# Patient Record
Sex: MALE | Race: OTHER | Employment: UNEMPLOYED | URBAN - METROPOLITAN AREA
[De-identification: names, ages, dates, MRNs, and addresses within clinical notes are randomized per-mention and may not be internally consistent; named-entity substitution may affect disease eponyms.]

---

## 2020-11-21 PROBLEM — J34.0 CELLULITIS OF NOSTRIL: Status: ACTIVE | Noted: 2020-11-21

## 2024-05-14 ENCOUNTER — APPOINTMENT (OUTPATIENT)
Dept: GENERAL RADIOLOGY | Age: 39
End: 2024-05-14
Attending: NURSE PRACTITIONER

## 2024-05-14 ENCOUNTER — HOSPITAL ENCOUNTER (OUTPATIENT)
Age: 39
Discharge: HOME | End: 2024-05-14

## 2024-05-14 VITALS
HEIGHT: 70 IN | RESPIRATION RATE: 16 BRPM | SYSTOLIC BLOOD PRESSURE: 114 MMHG | HEART RATE: 73 BPM | TEMPERATURE: 98 F | DIASTOLIC BLOOD PRESSURE: 69 MMHG | BODY MASS INDEX: 22.76 KG/M2 | WEIGHT: 159 LBS | OXYGEN SATURATION: 98 %

## 2024-05-14 DIAGNOSIS — R05.1 ACUTE COUGH: Primary | ICD-10-CM

## 2024-05-14 DIAGNOSIS — J98.8 VIRAL RESPIRATORY ILLNESS: ICD-10-CM

## 2024-05-14 DIAGNOSIS — B97.89 VIRAL RESPIRATORY ILLNESS: ICD-10-CM

## 2024-05-14 DIAGNOSIS — R52 BODY ACHES: ICD-10-CM

## 2024-05-14 DIAGNOSIS — H65.91 RIGHT NON-SUPPURATIVE OTITIS MEDIA: ICD-10-CM

## 2024-05-14 LAB
POCT INFLUENZA A: NEGATIVE
POCT INFLUENZA B: NEGATIVE

## 2024-05-14 PROCEDURE — 71046 X-RAY EXAM CHEST 2 VIEWS: CPT | Performed by: NURSE PRACTITIONER

## 2024-05-14 PROCEDURE — 99204 OFFICE O/P NEW MOD 45 MIN: CPT | Performed by: NURSE PRACTITIONER

## 2024-05-14 PROCEDURE — 87502 INFLUENZA DNA AMP PROBE: CPT | Performed by: NURSE PRACTITIONER

## 2024-05-14 RX ORDER — BENZONATATE 100 MG/1
100 CAPSULE ORAL 3 TIMES DAILY PRN
Qty: 30 CAPSULE | Refills: 0 | Status: SHIPPED | OUTPATIENT
Start: 2024-05-14 | End: 2024-06-13

## 2024-05-14 RX ORDER — AMOXICILLIN AND CLAVULANATE POTASSIUM 875; 125 MG/1; MG/1
1 TABLET, FILM COATED ORAL 2 TIMES DAILY
Qty: 14 TABLET | Refills: 0 | Status: SHIPPED | OUTPATIENT
Start: 2024-05-14 | End: 2024-05-21

## 2024-05-14 NOTE — DISCHARGE INSTRUCTIONS
Take the medications as prescribed.  Over-the-counter Tylenol/Motrin as needed for fever/body aches.  Stay well-hydrated.  Follow-up with your doctor.

## 2024-05-14 NOTE — ED PROVIDER NOTES
Patient Seen in: [facility]      History     Chief Complaint   Patient presents with    Cough/URI    Ear Pain     Stated Complaint: flu symptoms -fever-cough-chest pain    Subjective:   A 38-year-old male, who presents with a dry cough that started 4 days ago.  States it has become more productive, with some green sputum.  Has been taking Tylenol and over-the-counter cough syrup which is not helping.  States his daughter daughter is also have a cough.  Has also had fever at home with a high of 102.  The last couple days he has had bodyaches.  Last night he developed right-sided ear pain.  States that only when he coughs, he feels pain to his throat and middle of the chest region.  Otherwise no chest pain.  States he did a COVID test at home, a couple times and were both negative.            Objective:   History reviewed. No pertinent past medical history.           History reviewed. No pertinent surgical history.             Social History     Socioeconomic History    Marital status:    Tobacco Use    Smoking status: Former     Current packs/day: 0.00     Average packs/day: 0.3 packs/day for 20.0 years (5.0 ttl pk-yrs)     Types: Cigarettes     Start date: 2001     Quit date: 2021     Years since quittin.9    Smokeless tobacco: Former     Quit date: 2021    Tobacco comments:     FORMER   Vaping Use    Vaping status: Former    Substances: Nicotine   Substance and Sexual Activity    Alcohol use: Yes     Comment: ONCE MONTHLY    Drug use: No    Sexual activity: Yes     Partners: Female              Review of Systems   Constitutional:  Positive for fever.   HENT:  Positive for ear pain. Negative for congestion, rhinorrhea and sore throat.    Respiratory:  Positive for cough.    Musculoskeletal:  Positive for myalgias.   All other systems reviewed and are negative.      Positive for stated complaint: flu symptoms -fever-cough-chest pain  Other systems are as noted in  HPI.  Constitutional and vital signs reviewed.      All other systems reviewed and negative except as noted above.    Physical Exam     ED Triage Vitals [05/14/24 0946]   /69   Pulse 73   Resp 16   Temp 97.8 °F (36.6 °C)   Temp src Temporal   SpO2 98 %   O2 Device None (Room air)       Current Vitals:   Vital Signs  BP: 114/69  Pulse: 73  Resp: 16  Temp: 97.8 °F (36.6 °C)  Temp src: Temporal    Oxygen Therapy  SpO2: 98 %  O2 Device: None (Room air)            Physical Exam  Vitals and nursing note reviewed.   Constitutional:       Appearance: Normal appearance. He is not ill-appearing, toxic-appearing or diaphoretic.   HENT:      Head:      Jaw: No trismus.      Right Ear: Ear canal and external ear normal. No mastoid tenderness. Tympanic membrane is injected and bulging. Tympanic membrane is not perforated.      Left Ear: Tympanic membrane, ear canal and external ear normal.      Nose: No rhinorrhea.      Mouth/Throat:      Lips: Pink.      Mouth: No angioedema.      Pharynx: Oropharynx is clear. Uvula midline. No pharyngeal swelling.   Cardiovascular:      Rate and Rhythm: Normal rate and regular rhythm.      Pulses: Normal pulses.      Heart sounds: Normal heart sounds.   Pulmonary:      Effort: Pulmonary effort is normal. No respiratory distress.      Breath sounds: Normal breath sounds. No stridor. No wheezing, rhonchi or rales.   Chest:      Chest wall: No tenderness.   Skin:     General: Skin is warm and dry.      Coloration: Skin is not jaundiced or pale.      Findings: No rash.   Neurological:      General: No focal deficit present.      Mental Status: He is alert.   Psychiatric:         Mood and Affect: Mood normal.               ED Course     Labs Reviewed   POCT FLU TEST - Normal    Narrative:     This assay is a rapid molecular in vitro test utilizing nucleic acid amplification of influenza A and B viral RNA.     XR CHEST PA + LAT CHEST (CPT=71046)    Result Date: 5/14/2024  PROCEDURE:  XR CHEST  PA + LAT CHEST (CPT=71046)  INDICATIONS:  flu symptoms -fever-cough-chest pain  COMPARISON:  None.  TECHNIQUE:  PA and lateral chest radiographs were obtained.  PATIENT STATED HISTORY: (As transcribed by Technologist)  Patient states he has had cough, congestion, fever and body aches for the past 4-5 days.   FINDINGS:  The heart is normal in size.  Subtle bronchial wall thickening is present.  No signs of bronchial dilation or obvious bronchiectasis.  No focal consolidation is seen.  The costophrenic angles are sharp.  No sign of pneumothorax.            CONCLUSION:  Nonspecific subtle bronchial wall thickening, but consider subtle bronchitis.    LOCATION:  [facility]   Dictated by (CST): Varghese Thomas MD on 5/14/2024 at 10:14 AM     Finalized by (CST): Varghese Thomas MD on 5/14/2024 at 10:14 AM                         [facility]                                         Medical Decision Making  Differential diagnosis initially included but was not limited to: Flu, otitis media, pneumonia, other viral respiratory illness    Nontoxic-appearing 38-year-old male, with productive cough worsening over the last few days, body aches, right ear pain, fevers.  No mastoid tenderness.  Right tympanic membrane is injected and slightly bulging, concerning for otitis media.  Not likely to be mastoiditis.  No respiratory distress.  No adventitious breath sounds.  Will obtain chest x-ray rule out pneumonia.  Will obtain flu swab.  Negative flu.  I personally viewed, independently interpreted and radiology report was reviewed.  My personal interpretation is no pneumonia.  Likely viral cough.    Supportive/home management of diagnosis/illness/injury discussed. Red flag symptoms discussed.  Signs and symptoms/criteria that would necessitate reevaluation, including ER evaluation discussed.  Patient and/or responsible adult verbalize and agree with management and plan of care.    Speech recognition software was used during this dictation.  There  may be minor errors in transcription.          Amount and/or Complexity of Data Reviewed  Labs: ordered. Decision-making details documented in ED Course.  Radiology: ordered and independent interpretation performed. Decision-making details documented in ED Course.    Risk  OTC drugs.  Prescription drug management.        Disposition and Plan     Clinical Impression:  1. Acute cough    2. Body aches    3. Right non-suppurative otitis media    4. Viral respiratory illness         Disposition:  Discharge  5/14/2024 10:25 am    Follow-up:  Melissa Penn MD  [address] [address] [phone]    Call in 2 days            Medications Prescribed:  Current Discharge Medication List        START taking these medications    Details   amoxicillin clavulanate 875-125 MG Oral Tab Take 1 tablet by mouth 2 (two) times daily for 7 days.  Qty: 14 tablet, Refills: 0      benzonatate 100 MG Oral Cap Take 1 capsule (100 mg total) by mouth 3 (three) times daily as needed.  Qty: 30 capsule, Refills: 0

## 2024-10-24 ENCOUNTER — OFFICE VISIT (OUTPATIENT)
Dept: FAMILY MEDICINE CLINIC | Facility: CLINIC | Age: 39
End: 2024-10-24
Payer: COMMERCIAL

## 2024-10-24 VITALS
HEART RATE: 102 BPM | WEIGHT: 160 LBS | DIASTOLIC BLOOD PRESSURE: 72 MMHG | HEIGHT: 70 IN | TEMPERATURE: 98 F | SYSTOLIC BLOOD PRESSURE: 110 MMHG | BODY MASS INDEX: 22.9 KG/M2 | OXYGEN SATURATION: 99 % | RESPIRATION RATE: 18 BRPM

## 2024-10-24 DIAGNOSIS — L03.032 PARONYCHIA OF GREAT TOE OF LEFT FOOT: Primary | ICD-10-CM

## 2024-10-24 PROCEDURE — 99214 OFFICE O/P EST MOD 30 MIN: CPT | Performed by: FAMILY MEDICINE

## 2024-10-24 RX ORDER — CEPHALEXIN 500 MG/1
500 CAPSULE ORAL 3 TIMES DAILY
Qty: 30 CAPSULE | Refills: 0 | Status: SHIPPED | OUTPATIENT
Start: 2024-10-24 | End: 2024-11-03

## 2024-10-24 RX ORDER — CETIRIZINE HYDROCHLORIDE 10 MG/1
10 TABLET ORAL DAILY
COMMUNITY

## 2024-10-24 NOTE — PROGRESS NOTES
Chief Complaint   Patient presents with    Toenail     Toe nail infection. Left large toe, pain and pus, nail is loose, not painful unless he is pushing on it. Pus material does come out when he is in the shower.        HPI:    Patient ID: Eugene Damian is a 38 year old male.    HPI left toe nail bit kati infection. Since 1 month tender and pus is coming. No red. No injury. No concern for fracture.     Review of Systems  Pos toinail pain      Current Outpatient Medications   Medication Sig Dispense Refill    cetirizine 10 MG Oral Tab Take 1 tablet (10 mg total) by mouth daily.      Ergocalciferol (VITAMIN D OR) Take by mouth.      Multiple Vitamin (MULTI-VITAMIN DAILY OR) Take 1 tablet by mouth.       Allergies:Allergies[1]    HISTORY:  No past medical history on file.   No past surgical history on file.   Family History   Problem Relation Age of Onset    No Known Problems Father     Thyroid disease Mother     No Known Problems Maternal Grandmother     No Known Problems Maternal Grandfather     No Known Problems Paternal Grandmother     No Known Problems Paternal Grandfather       Social History:   Social History     Socioeconomic History    Marital status:    Tobacco Use    Smoking status: Former     Current packs/day: 0.00     Average packs/day: 0.3 packs/day for 20.0 years (5.0 ttl pk-yrs)     Types: Cigarettes     Start date: 5/17/2001     Quit date: 5/17/2021     Years since quitting: 3.5    Smokeless tobacco: Former     Quit date: 4/27/2021    Tobacco comments:     FORMER   Vaping Use    Vaping status: Former    Substances: Nicotine   Substance and Sexual Activity    Alcohol use: Yes     Comment: ONCE MONTHLY    Drug use: No    Sexual activity: Yes     Partners: Female        PHYSICAL EXAM:    /72 (BP Location: Left arm, Patient Position: Sitting, Cuff Size: adult)   Pulse 102   Temp 98.1 °F (36.7 °C) (Temporal)   Resp 18   Ht 5' 10\" (1.778 m)   Wt 160 lb (72.6 kg)   SpO2 99%   BMI 22.96  kg/m²    Physical Exam  Constitutional:       General: He is not in acute distress.     Appearance: He is not ill-appearing.   Skin:     Comments: Exam of left toe nail redness with tenderenss no pus noted   Neurological:      Mental Status: He is alert.              ASSESSMENT/PLAN:   1. Paronychia of great toe of left foot  Meds stared  See podiatry  - cephALEXin 500 MG Oral Cap; Take 1 capsule (500 mg total) by mouth 3 (three) times daily for 10 days.  Dispense: 30 capsule; Refill: 0  - Podiatry Referral - In Network             No follow-ups on file.        [1]   Allergies  Allergen Reactions    Shrimp HIVES    Other RASH     Detergent

## 2025-01-16 ENCOUNTER — OFFICE VISIT (OUTPATIENT)
Dept: FAMILY MEDICINE CLINIC | Facility: CLINIC | Age: 40
End: 2025-01-16
Payer: COMMERCIAL

## 2025-01-16 VITALS
WEIGHT: 160 LBS | BODY MASS INDEX: 23 KG/M2 | SYSTOLIC BLOOD PRESSURE: 109 MMHG | OXYGEN SATURATION: 98 % | RESPIRATION RATE: 14 BRPM | HEART RATE: 86 BPM | TEMPERATURE: 98 F | DIASTOLIC BLOOD PRESSURE: 66 MMHG

## 2025-01-16 DIAGNOSIS — R05.1 ACUTE COUGH: ICD-10-CM

## 2025-01-16 DIAGNOSIS — J01.40 ACUTE NON-RECURRENT PANSINUSITIS: Primary | ICD-10-CM

## 2025-01-16 PROCEDURE — 99213 OFFICE O/P EST LOW 20 MIN: CPT | Performed by: NURSE PRACTITIONER

## 2025-01-16 RX ORDER — PREDNISONE 20 MG/1
20 TABLET ORAL DAILY
Qty: 5 TABLET | Refills: 0 | Status: SHIPPED | OUTPATIENT
Start: 2025-01-16 | End: 2025-01-21

## 2025-01-16 RX ORDER — ALBUTEROL SULFATE 90 UG/1
1-2 INHALANT RESPIRATORY (INHALATION) EVERY 4 HOURS PRN
Qty: 1 EACH | Refills: 0 | Status: SHIPPED | OUTPATIENT
Start: 2025-01-16

## 2025-01-16 NOTE — PROGRESS NOTES
HPI:   Sinus Problem  This is a new problem. Episode onset: 2 weeks. The problem has been gradually worsening (last 3-4 days) since onset. Maximum temperature: , mostly at night. Associated symptoms include chills, congestion, coughing, ear pain (right), headaches, shortness of breath and sinus pressure. Pertinent negatives include no sore throat. Past treatments include acetaminophen (and Tessalon). The treatment provided no relief.        Current Outpatient Medications   Medication Sig Dispense Refill    cetirizine 10 MG Oral Tab Take 1 tablet (10 mg total) by mouth daily.      Ergocalciferol (VITAMIN D OR) Take by mouth.      Multiple Vitamin (MULTI-VITAMIN DAILY OR) Take 1 tablet by mouth.        No past medical history on file.   No past surgical history on file.   Family History   Problem Relation Age of Onset    No Known Problems Father     Thyroid disease Mother     No Known Problems Maternal Grandmother     No Known Problems Maternal Grandfather     No Known Problems Paternal Grandmother     No Known Problems Paternal Grandfather       Social History     Socioeconomic History    Marital status:    Tobacco Use    Smoking status: Former     Current packs/day: 0.00     Average packs/day: 0.3 packs/day for 20.0 years (5.0 ttl pk-yrs)     Types: Cigarettes     Start date: 5/17/2001     Quit date: 5/17/2021     Years since quitting: 3.6     Passive exposure: Never    Smokeless tobacco: Former     Quit date: 4/27/2021    Tobacco comments:     FORMER   Vaping Use    Vaping status: Former    Substances: Nicotine   Substance and Sexual Activity    Alcohol use: Yes     Comment: ONCE MONTHLY    Drug use: No    Sexual activity: Yes     Partners: Female         REVIEW OF SYSTEMS:   Review of Systems   Constitutional:  Positive for chills, fatigue and fever.   HENT:  Positive for congestion, ear pain (right), postnasal drip, rhinorrhea, sinus pressure and sinus pain. Negative for sore throat.    Respiratory:   Positive for cough, chest tightness and shortness of breath. Negative for wheezing.    Cardiovascular:  Negative for chest pain and palpitations.   Gastrointestinal:  Negative for diarrhea, nausea and vomiting.   Musculoskeletal:  Negative for myalgias.   Neurological:  Positive for headaches.       EXAM:   /66   Pulse 86   Temp 97.9 °F (36.6 °C) (Temporal)   Resp 14   Wt 160 lb (72.6 kg)   SpO2 98%   BMI 22.96 kg/m²   Physical Exam  Constitutional:       General: He is not in acute distress.     Appearance: He is ill-appearing.   HENT:      Right Ear: Ear canal and external ear normal. Tympanic membrane is erythematous. Tympanic membrane is not bulging.      Left Ear: Tympanic membrane, ear canal and external ear normal.      Nose: Mucosal edema and rhinorrhea present. Rhinorrhea is purulent.      Right Sinus: Maxillary sinus tenderness and frontal sinus tenderness present.      Left Sinus: Maxillary sinus tenderness and frontal sinus tenderness present.      Mouth/Throat:      Lips: Pink.      Mouth: Mucous membranes are moist.      Pharynx: Oropharynx is clear. Uvula midline.   Eyes:      Conjunctiva/sclera: Conjunctivae normal.   Cardiovascular:      Rate and Rhythm: Normal rate and regular rhythm.      Heart sounds: Normal heart sounds.   Pulmonary:      Effort: Pulmonary effort is normal.      Breath sounds: Normal breath sounds.   Neurological:      Mental Status: He is alert.         ASSESSMENT AND PLAN:   There are no diagnoses linked to this encounter.

## 2025-03-07 ENCOUNTER — OFFICE VISIT (OUTPATIENT)
Dept: FAMILY MEDICINE CLINIC | Facility: CLINIC | Age: 40
End: 2025-03-07
Payer: COMMERCIAL

## 2025-03-07 ENCOUNTER — LAB ENCOUNTER (OUTPATIENT)
Dept: LAB | Age: 40
End: 2025-03-07
Payer: COMMERCIAL

## 2025-03-07 VITALS
HEIGHT: 70 IN | WEIGHT: 160.19 LBS | OXYGEN SATURATION: 98 % | TEMPERATURE: 98 F | RESPIRATION RATE: 16 BRPM | BODY MASS INDEX: 22.93 KG/M2

## 2025-03-07 DIAGNOSIS — Z00.00 GENERAL MEDICAL EXAMINATION: ICD-10-CM

## 2025-03-07 DIAGNOSIS — R55 VASOVAGAL RESPONSE: Primary | ICD-10-CM

## 2025-03-07 DIAGNOSIS — R42 DIZZINESS ON STANDING: ICD-10-CM

## 2025-03-07 LAB
ALBUMIN SERPL-MCNC: 5.2 G/DL (ref 3.2–4.8)
ALBUMIN/GLOB SERPL: 1.8 {RATIO} (ref 1–2)
ALP LIVER SERPL-CCNC: 72 U/L
ALT SERPL-CCNC: 23 U/L
ANION GAP SERPL CALC-SCNC: 9 MMOL/L (ref 0–18)
AST SERPL-CCNC: 24 U/L (ref ?–34)
BASOPHILS # BLD AUTO: 0.1 X10(3) UL (ref 0–0.2)
BASOPHILS NFR BLD AUTO: 2.2 %
BILIRUB SERPL-MCNC: 0.7 MG/DL (ref 0.3–1.2)
BUN BLD-MCNC: 15 MG/DL (ref 9–23)
CALCIUM BLD-MCNC: 10.5 MG/DL (ref 8.7–10.6)
CHLORIDE SERPL-SCNC: 101 MMOL/L (ref 98–112)
CHOLEST SERPL-MCNC: 167 MG/DL (ref ?–200)
CO2 SERPL-SCNC: 30 MMOL/L (ref 21–32)
CREAT BLD-MCNC: 1.26 MG/DL
DEPRECATED HBV CORE AB SER IA-ACNC: 127 NG/ML
EGFRCR SERPLBLD CKD-EPI 2021: 73 ML/MIN/1.73M2 (ref 60–?)
EOSINOPHIL # BLD AUTO: 0.31 X10(3) UL (ref 0–0.7)
EOSINOPHIL NFR BLD AUTO: 6.7 %
ERYTHROCYTE [DISTWIDTH] IN BLOOD BY AUTOMATED COUNT: 13.7 %
EST. AVERAGE GLUCOSE BLD GHB EST-MCNC: 128 MG/DL (ref 68–126)
FASTING PATIENT LIPID ANSWER: YES
FASTING STATUS PATIENT QL REPORTED: YES
GLOBULIN PLAS-MCNC: 2.9 G/DL (ref 2–3.5)
GLUCOSE BLD-MCNC: 95 MG/DL (ref 70–99)
HBA1C MFR BLD: 6.1 % (ref ?–5.7)
HCT VFR BLD AUTO: 48.9 %
HDLC SERPL-MCNC: 78 MG/DL (ref 40–59)
HGB BLD-MCNC: 15.5 G/DL
IMM GRANULOCYTES # BLD AUTO: 0.01 X10(3) UL (ref 0–1)
IMM GRANULOCYTES NFR BLD: 0.2 %
IRON SATN MFR SERPL: 35 %
IRON SERPL-MCNC: 127 UG/DL
LDLC SERPL CALC-MCNC: 80 MG/DL (ref ?–100)
LYMPHOCYTES # BLD AUTO: 1.94 X10(3) UL (ref 1–4)
LYMPHOCYTES NFR BLD AUTO: 41.8 %
MCH RBC QN AUTO: 26.5 PG (ref 26–34)
MCHC RBC AUTO-ENTMCNC: 31.7 G/DL (ref 31–37)
MCV RBC AUTO: 83.4 FL
MONOCYTES # BLD AUTO: 0.45 X10(3) UL (ref 0.1–1)
MONOCYTES NFR BLD AUTO: 9.7 %
NEUTROPHILS # BLD AUTO: 1.83 X10 (3) UL (ref 1.5–7.7)
NEUTROPHILS # BLD AUTO: 1.83 X10(3) UL (ref 1.5–7.7)
NEUTROPHILS NFR BLD AUTO: 39.4 %
NONHDLC SERPL-MCNC: 89 MG/DL (ref ?–130)
OSMOLALITY SERPL CALC.SUM OF ELEC: 291 MOSM/KG (ref 275–295)
PLATELET # BLD AUTO: 254 10(3)UL (ref 150–450)
POTASSIUM SERPL-SCNC: 4.6 MMOL/L (ref 3.5–5.1)
PROT SERPL-MCNC: 8.1 G/DL (ref 5.7–8.2)
RBC # BLD AUTO: 5.86 X10(6)UL
SODIUM SERPL-SCNC: 140 MMOL/L (ref 136–145)
TOTAL IRON BINDING CAPACITY: 367 UG/DL (ref 250–425)
TRANSFERRIN SERPL-MCNC: 306 MG/DL (ref 215–365)
TRIGL SERPL-MCNC: 40 MG/DL (ref 30–149)
TSI SER-ACNC: 1.25 UIU/ML (ref 0.55–4.78)
VLDLC SERPL CALC-MCNC: 6 MG/DL (ref 0–30)
WBC # BLD AUTO: 4.6 X10(3) UL (ref 4–11)

## 2025-03-07 PROCEDURE — 80061 LIPID PANEL: CPT

## 2025-03-07 PROCEDURE — 83540 ASSAY OF IRON: CPT

## 2025-03-07 PROCEDURE — 82728 ASSAY OF FERRITIN: CPT

## 2025-03-07 PROCEDURE — 83036 HEMOGLOBIN GLYCOSYLATED A1C: CPT

## 2025-03-07 PROCEDURE — 85025 COMPLETE CBC W/AUTO DIFF WBC: CPT

## 2025-03-07 PROCEDURE — 36415 COLL VENOUS BLD VENIPUNCTURE: CPT

## 2025-03-07 PROCEDURE — 99213 OFFICE O/P EST LOW 20 MIN: CPT

## 2025-03-07 PROCEDURE — 83550 IRON BINDING TEST: CPT

## 2025-03-07 PROCEDURE — 80053 COMPREHEN METABOLIC PANEL: CPT

## 2025-03-07 PROCEDURE — 84443 ASSAY THYROID STIM HORMONE: CPT

## 2025-03-07 NOTE — PROGRESS NOTES
Chief Complaint   Patient presents with    Dizziness     Fatigue   Cold hands and feet   Symptoms for over a year            HPI  Eugene Damian is a 39 year old M pt who presents today for dizziness    Describes symptoms as feeling lightheaded when standing. Symptoms occur during various activities such as working, playing with children, standing after sitting for 20-30 minutes, or upon waking. Each episode lasts 2-5 minutes and is accompanied by feeling cold, diaphoresis of the hands, feet, and scalp. The patient reports experiencing these symptoms for over a year, with a frequency of 2-3 times daily. He admits to not drinking water throughout the day.    The patient denies any loss of consciousness, seizures, or recent illnesses. The patient denies chest pain or SOB associated with the dizziness. He denies dizziness with head movement.      ROS  As per HPI    History reviewed. No pertinent past medical history.    History reviewed. No pertinent surgical history.    Social History     Socioeconomic History    Marital status:    Tobacco Use    Smoking status: Former     Current packs/day: 0.00     Average packs/day: 0.3 packs/day for 20.0 years (5.0 ttl pk-yrs)     Types: Cigarettes     Start date: 5/17/2001     Quit date: 5/17/2021     Years since quitting: 3.8     Passive exposure: Never    Smokeless tobacco: Former     Quit date: 4/27/2021    Tobacco comments:     FORMER   Vaping Use    Vaping status: Former    Substances: Nicotine   Substance and Sexual Activity    Alcohol use: Yes     Comment: ONCE MONTHLY    Drug use: No    Sexual activity: Yes     Partners: Female       Family History   Problem Relation Age of Onset    No Known Problems Father     Thyroid disease Mother     No Known Problems Maternal Grandmother     No Known Problems Maternal Grandfather     No Known Problems Paternal Grandmother     No Known Problems Paternal Grandfather         Medications Ordered Prior to  Encounter[1]      Objective  Vitals:    03/07/25 1025 03/07/25 1035 03/07/25 1036   Ortho BP: 100/58 96/62 100/74   Orthostatic Pulse: 72 72 86     Vitals:    03/07/25 1025   Resp: 16   Temp: 98.3 °F (36.8 °C)   SpO2: 98%   Weight: 160 lb 3.2 oz (72.7 kg)   Height: 5' 10\" (1.778 m)     =Body mass index is 22.99 kg/m².    Physical Exam  Constitutional:       Appearance: Normal appearance.   HEENT:      Head: Normocephalic and atraumatic.      Eyes: PERRLA no notable nystagmus     Ears: normal on observation, TM clear b/l  Cardiovascular:      Rate and Rhythm: Normal rate and regular rhythm.   Pulmonary:      Effort: Pulmonary effort is normal.      Breath sounds: Normal breath sounds.   Musculoskeletal:         General: Normal range of motion.   Skin:     General: Skin is warm and dry.   Neurological:      General: No focal deficit present.      Mental Status: Alert and oriented to person, place, and time.   Psychiatric:         Mood and Affect: Mood normal.         Thought Content: Thought content normal.       Assessment and Plan  Eugene was seen today for dizziness.    Diagnoses and all orders for this visit:    Vasovagal response    Dizziness on standing  Comments:  Likely vasovagal  Orders:  -     Comp Metabolic Panel (14); Future  -     CBC With Differential With Platelet; Future  -     Iron And Tibc [E]; Future  -     Ferritin [E]; Future    General medical examination  -     TSH W Reflex To Free T4; Future  -     Lipid Panel; Future  -     Hemoglobin A1C [E]; Future       Labs ordered as above  Recommend oral hydration and getting up slowing from sitting position  Consider starting discussed medication(meclizine) if this continues   Pt verbalizes understanding, all questions/concerns addressed, in agreement w/plan      Follow up  Return in about 4 weeks (around 4/4/2025) for dizziness .      Patient Instructions  Patient Instructions   Obtain labs   Recommend oral hydration and getting up slowing from sitting  position  Consider starting discussed medication(meclizine) if this continues       Alicia Palmer MD          [1]   Current Outpatient Medications on File Prior to Visit   Medication Sig Dispense Refill    cetirizine 10 MG Oral Tab Take 1 tablet (10 mg total) by mouth daily.      Ergocalciferol (VITAMIN D OR) Take by mouth.       No current facility-administered medications on file prior to visit.

## 2025-03-07 NOTE — PATIENT INSTRUCTIONS
Obtain labs   Recommend oral hydration and getting up slowing from sitting position  Consider starting discussed medication(meclizine) if this continues

## 2025-03-13 ENCOUNTER — PATIENT MESSAGE (OUTPATIENT)
Dept: FAMILY MEDICINE CLINIC | Facility: CLINIC | Age: 40
End: 2025-03-13

## 2025-03-14 ENCOUNTER — PATIENT MESSAGE (OUTPATIENT)
Dept: FAMILY MEDICINE CLINIC | Facility: CLINIC | Age: 40
End: 2025-03-14

## 2025-03-14 ENCOUNTER — TELEPHONE (OUTPATIENT)
Dept: FAMILY MEDICINE CLINIC | Facility: CLINIC | Age: 40
End: 2025-03-14

## 2025-03-14 DIAGNOSIS — R73.03 PREDIABETES: Primary | ICD-10-CM

## 2025-06-26 ENCOUNTER — HOSPITAL ENCOUNTER (OUTPATIENT)
Age: 40
Discharge: HOME | End: 2025-06-26
Payer: COMMERCIAL

## 2025-06-26 ENCOUNTER — APPOINTMENT (OUTPATIENT)
Dept: GENERAL RADIOLOGY | Age: 40
End: 2025-06-26
Attending: NURSE PRACTITIONER
Payer: COMMERCIAL

## 2025-06-26 VITALS
SYSTOLIC BLOOD PRESSURE: 110 MMHG | TEMPERATURE: 98 F | RESPIRATION RATE: 16 BRPM | DIASTOLIC BLOOD PRESSURE: 78 MMHG | OXYGEN SATURATION: 96 % | HEART RATE: 78 BPM

## 2025-06-26 DIAGNOSIS — M25.531 RIGHT WRIST PAIN: Primary | ICD-10-CM

## 2025-06-26 PROCEDURE — L3908 WHO COCK-UP NONMOLDE PRE OTS: HCPCS | Performed by: NURSE PRACTITIONER

## 2025-06-26 PROCEDURE — 73110 X-RAY EXAM OF WRIST: CPT | Performed by: NURSE PRACTITIONER

## 2025-06-26 PROCEDURE — 99213 OFFICE O/P EST LOW 20 MIN: CPT | Performed by: NURSE PRACTITIONER

## 2025-06-26 RX ORDER — IBUPROFEN 600 MG/1
600 TABLET, FILM COATED ORAL ONCE
Status: COMPLETED | OUTPATIENT
Start: 2025-06-26 | End: 2025-06-26

## 2025-06-26 NOTE — DISCHARGE INSTRUCTIONS
Please wear wrist splint while awake.  Rest, ice, and elevate.  Take splint off when showering and sleeping.  Tylenol and ibuprofen for pain.  Follow closely with Orthopedics as discussed

## 2025-06-26 NOTE — ED PROVIDER NOTES
Patient Seen in: [facility]        History  Chief Complaint   Patient presents with    Wrist Pain     Stated Complaint: rt wrist pain    Subjective:   This is a 39-year-old male with no significant past medical history.  Presents to immediate care for right wrist pain.  Symptom started a few weeks ago.  Reports weakness in  strength.  Pain is worse with movement.  Reports he had fracture in this wrist a few years ago.  No new trauma or injury.  No numbness or tingling.  No treatment attempted prior to arrival.     The history is provided by the patient.                     Objective:     History reviewed. No pertinent past medical history.           History reviewed. No pertinent surgical history.             Social History     Socioeconomic History    Marital status:    Tobacco Use    Smoking status: Former     Current packs/day: 0.00     Average packs/day: 0.3 packs/day for 20.0 years (5.0 ttl pk-yrs)     Types: Cigarettes     Start date: 2001     Quit date: 2021     Years since quittin.1     Passive exposure: Never    Smokeless tobacco: Former     Quit date: 2021    Tobacco comments:     FORMER   Vaping Use    Vaping status: Former    Substances: Nicotine   Substance and Sexual Activity    Alcohol use: Yes     Comment: ONCE MONTHLY    Drug use: No    Sexual activity: Yes     Partners: Female              Review of Systems   Constitutional:  Negative for chills and fever.   HENT:  Negative for congestion and sore throat.    Respiratory:  Negative for cough.    Cardiovascular:  Negative for chest pain.   Gastrointestinal:  Negative for abdominal pain, constipation, diarrhea, nausea and vomiting.   Musculoskeletal:  Positive for arthralgias. Negative for back pain, joint swelling, neck pain and neck stiffness.   Neurological:  Negative for headaches.       Positive for stated complaint: rt wrist pain  Other systems are as noted in HPI.  Constitutional and vital signs  reviewed.      All other systems reviewed and negative except as noted above.                  Physical Exam    ED Triage Vitals [06/26/25 1159]   /78   Pulse 78   Resp 16   Temp 98.3 °F (36.8 °C)   Temp src Oral   SpO2 96 %   O2 Device None (Room air)       Current Vitals:   Vital Signs  BP: 110/78  Pulse: 78  Resp: 16  Temp: 98.3 °F (36.8 °C)  Temp src: Oral    Oxygen Therapy  SpO2: 96 %  O2 Device: None (Room air)            Physical Exam  Vitals and nursing note reviewed.   Constitutional:       General: He is not in acute distress.     Appearance: Normal appearance. He is not ill-appearing, toxic-appearing or diaphoretic.   HENT:      Head: Normocephalic and atraumatic.      Right Ear: External ear normal.      Left Ear: External ear normal.      Nose: Nose normal.      Mouth/Throat:      Mouth: Mucous membranes are moist.      Pharynx: Oropharynx is clear.   Eyes:      General:         Right eye: No discharge.         Left eye: No discharge.      Extraocular Movements: Extraocular movements intact.      Conjunctiva/sclera: Conjunctivae normal.   Cardiovascular:      Rate and Rhythm: Normal rate.   Pulmonary:      Effort: Pulmonary effort is normal.   Musculoskeletal:      Right forearm: Normal.      Right wrist: Tenderness present. No swelling, deformity, effusion, lacerations, bony tenderness, snuff box tenderness or crepitus. Decreased range of motion. Normal pulse.      Right hand: Normal.      Cervical back: Neck supple.   Skin:     General: Skin is warm and dry.      Capillary Refill: Capillary refill takes less than 2 seconds.      Findings: No rash.   Neurological:      Mental Status: He is alert and oriented to person, place, and time.   Psychiatric:         Mood and Affect: Mood normal.         Behavior: Behavior normal.                 ED Course  Labs Reviewed - No data to display       Xray right wrist                  MDM     Vital signs stable.  Patient is well appearing and non toxic  looking.  Presents to the IC for right wrist pain.     Differential diagnosis includes but is no limited too sprain, contusion, cellulitis, fracture, arthritis, tendonitis, carpal tunnel    Generalized tenderness across the wrist.  No snuffbox tenderness. No swelling or erythema.  Distal CMS intact.    Xrays films reviewed and interpreted by myself. Results show no acute osseous findings.    Clinical impression is tendinitis     Velcro wrist splint placed on the right in my presence.  Neurovascular intact replacement.    Dc home. [facility] instructions reviewed.  Wrist splint on only while awake.  Tylenol/ibuprofen for pain. Ortho follow up.  Patient verbalized understanding and agreed with plan of care.  All questions were answered.              Medical Decision Making      Disposition and Plan     Clinical Impression:  1. Right wrist pain         Disposition:  Discharge  6/26/2025 12:50 pm    Follow-up:  Dixie Mcdaniel PA  [address] [place] [id number]  [phone]    In 1 week            Medications Prescribed:  Current Discharge Medication List                Supplementary Documentation:

## 2025-07-10 ENCOUNTER — OFFICE VISIT (OUTPATIENT)
Dept: FAMILY MEDICINE CLINIC | Facility: CLINIC | Age: 40
End: 2025-07-10
Payer: COMMERCIAL

## 2025-07-10 VITALS
BODY MASS INDEX: 23 KG/M2 | SYSTOLIC BLOOD PRESSURE: 102 MMHG | HEART RATE: 72 BPM | WEIGHT: 157 LBS | DIASTOLIC BLOOD PRESSURE: 70 MMHG | OXYGEN SATURATION: 98 % | TEMPERATURE: 98 F

## 2025-07-10 DIAGNOSIS — M25.531 RIGHT WRIST PAIN: Primary | ICD-10-CM

## 2025-07-10 PROCEDURE — 99214 OFFICE O/P EST MOD 30 MIN: CPT | Performed by: FAMILY MEDICINE

## 2025-07-10 RX ORDER — NAPROXEN 500 MG/1
500 TABLET ORAL 2 TIMES DAILY WITH MEALS
Qty: 20 TABLET | Refills: 0 | Status: SHIPPED | OUTPATIENT
Start: 2025-07-10

## 2025-07-10 NOTE — PROGRESS NOTES
The following individual(s) verbally consented to be recorded using ambient AI listening technology and understand that they can each withdraw their consent to this listening technology at any point by asking the clinician to turn off or pause the recording:    Patient name: Eugene Damian

## 2025-07-11 ENCOUNTER — LAB ENCOUNTER (OUTPATIENT)
Dept: LAB | Age: 40
End: 2025-07-11
Payer: COMMERCIAL

## 2025-07-11 ENCOUNTER — TELEPHONE (OUTPATIENT)
Dept: FAMILY MEDICINE CLINIC | Facility: CLINIC | Age: 40
End: 2025-07-11

## 2025-07-11 DIAGNOSIS — R73.03 PREDIABETES: ICD-10-CM

## 2025-07-11 LAB
EST. AVERAGE GLUCOSE BLD GHB EST-MCNC: 128 MG/DL (ref 68–126)
HBA1C MFR BLD: 6.1 % (ref ?–5.7)

## 2025-07-11 PROCEDURE — 83036 HEMOGLOBIN GLYCOSYLATED A1C: CPT

## 2025-07-11 PROCEDURE — 36415 COLL VENOUS BLD VENIPUNCTURE: CPT

## 2025-07-11 RX ORDER — METHYLPREDNISOLONE 4 MG/1
TABLET ORAL
Qty: 1 EACH | Refills: 0 | Status: SHIPPED | OUTPATIENT
Start: 2025-07-11

## 2025-07-11 NOTE — PROGRESS NOTES
Subjective:   Eugene Damian is a 39 year old male who presents for Urgent Care F/u (Still having hand pain, no swelling, ibuprofen to help)       History/Other:   History of Present Illness  Eugene Damian is a 39 year old male who presents with right wrist pain.    He has been experiencing right wrist pain for over two months, which began approximately one week after painting his house. The pain is sharp and located on the lateral dorsum of the wrist, without radiation. Can feel like burning sensation. No weakness. Denies swelling, warmth, or redness. No neck pain.     The pain worsens with movements such as opening his fingers. He reports no significant weakness or numbness. No acute trauma/injury.    He has been taking ibuprofen, 200 mg, two to three tablets twice a day, but it has not been effective. He wears a wrist brace, especially at night, but it does not provide significant relief.    Denies hx of gastric ulcers, smoking, or significant alcohol use. He was noted to have a hemoglobin A1c of 6.1% in March, indicating prediabetes, but he has no history of diabetes.    No other issues reported     Chief Complaint Reviewed and Verified  Nursing Notes Reviewed and   Verified  Tobacco Reviewed  Allergies Reviewed  Medications Reviewed    Problem List Reviewed  Medical History Reviewed  Surgical History   Reviewed  Family History Reviewed             Review of Systems:  REVIEW OF SYSTEMS:    CONSTITUTIONAL:  No unintentional weight loss. No fever. No chills.   EYES:  No blurred vision. No double vision. No eye pain, erythema, or discharge.   ENT:  No sore throat, mo URI symptoms.  CARDIOVASCULAR:  No chest pain. No lightheadedness. No palpitations. No lower extremity edema.  RESPIRATORY:  No shortness of breath. No wheezing. No cough. No hemoptysis.   GASTROINTESTINAL:  Normal appetite. No nausea, vomiting, diarrhea. No abd pain. No melena.  GENITOURINARY:  No frequency, urgency. No hematuria or  dysuria.  MUSCULOSKELETAL:  +R wrist pain. No gait changes.   INTEGUMENTARY:  No swelling. No bruising. No contusions. No abrasions. No lymphangitis. No rash.   NEUROLOGIC:  No headache. No dizziness. No weakness. +tingling in R Wrist  PSYCHIATRIC:  No anxiety. No depression.   ENDOCRINE:  No fatigue. No weakness. No neck swelling/pain.       Objective:   /70   Pulse 72   Temp 98 °F (36.7 °C)   Wt 157 lb (71.2 kg)   SpO2 98%   BMI 22.53 kg/m²  Estimated body mass index is 22.53 kg/m² as calculated from the following:    Height as of 3/7/25: 5' 10\" (1.778 m).    Weight as of this encounter: 157 lb (71.2 kg).  Results  LABS  HbA1c: 6.1% (03/2025)    RADIOLOGY  Wrist X-ray: Negative (06/2025)     Physical Exam  VITALS: Reviewed, weight and BMI reviewed.  GENERAL: Healthy appearing, well-developed, no acute distress.  PSYCHIATRIC: Normal mood, behavior and affect.  HEAD: Normocephalic and atraumatic.  NECK: Supple with no masses, nontender.  SKIN: Warm, well perfused, no skin rash  MUSCULOSKELETAL: RUE: Slight bony tenderness over R lateral dorsum wrist with no erythema/swelling/warmth noted, normal  strength, normal finger abduction strength, normal wrist extension strength, +pain with wrist flexion in right hand. No masses/lesions.   NEUROLOGICAL: Alert and oriented times 3, ambulating with no limitations, no focal deficits.      Assessment & Plan:   1. Right wrist pain (Primary)  -     Physical Therapy Referral - [facility]  -     Naproxen; Take 1 tablet (500 mg total) by mouth 2 (two) times daily with meals. Do not take with other nsaids.  Dispense: 20 tablet; Refill: 0    Assessment & Plan  Right wrist tendonitis  - Prescribe naproxen twice daily for 10 days with food (instead of ibuprofen)  - Order physical therapy referral   - Medrol dose samuel   - Advise continued use of wrist brace  - Instruct to rest the arm and avoid activities that exacerbate pain.  - If no improvement with physical  therapy, consider referral to a specialist     Prediabetes  Previous A1c 6.1% indicates prediabetes. Reassess glucose status as pt will be on steroids. Order already in system per Dr. Palmer.    Instructions provided as documented in the AVS.    The patient indicated understanding of the diagnosis and agreed with the plan of care.    Red flag s/s reviewed and discussed for conditions listed including concerns for prompt ED evaluation  Medical compliance with plan discussed and risks of non-compliance reviewed  Education completed on disease process, etiology & prognosis  Proper usage and side effects of medications reviewed & discussed    Return to clinic as clinically indicated or as discussed      Savana Block DO, 7/11/2025, 7:00 AM

## 2025-07-11 NOTE — TELEPHONE ENCOUNTER
Savana Block,   [facility] Staff  Pls notify pt I sent medrol dose samuel (low dose steroids) to help his wrist pain but he should still have A1c done (as ordered previously by dr. Palmer)    Thank you,  Dr. Block

## 2025-07-13 ENCOUNTER — RESULTS FOLLOW-UP (OUTPATIENT)
Dept: FAMILY MEDICINE CLINIC | Facility: CLINIC | Age: 40
End: 2025-07-13

## 2025-07-14 NOTE — TELEPHONE ENCOUNTER
----- Message from Alicia Palmer sent at 7/13/2025  8:07 PM CDT -----  A1C is unchanged from 4 months ago. Recommend starting metformin 500mg daily along with low carb/low sugar diet  ----- Message -----  From: Lab, Background User  Sent: 7/11/2025   5:34 PM CDT  To: Alicia Palmer MD

## 2025-07-15 RX ORDER — METFORMIN HYDROCHLORIDE 500 MG/1
500 TABLET, EXTENDED RELEASE ORAL DAILY
Qty: 90 TABLET | Refills: 0 | Status: CANCELLED | OUTPATIENT
Start: 2025-07-15

## 2025-07-24 ENCOUNTER — TELEPHONE (OUTPATIENT)
Dept: FAMILY MEDICINE CLINIC | Facility: CLINIC | Age: 40
End: 2025-07-24

## (undated) NOTE — Clinical Note
Pls notify pt I sent medrol dose samuel (low dose steroids) to help his wrist pain but he should still have A1c done (as ordered previously by dr. Palmer)   Thank you,  Dr. Block